# Patient Record
Sex: MALE | Race: WHITE | NOT HISPANIC OR LATINO | Employment: FULL TIME | URBAN - METROPOLITAN AREA
[De-identification: names, ages, dates, MRNs, and addresses within clinical notes are randomized per-mention and may not be internally consistent; named-entity substitution may affect disease eponyms.]

---

## 2023-03-11 ENCOUNTER — HOSPITAL ENCOUNTER (EMERGENCY)
Facility: CLINIC | Age: 28
Discharge: HOME | End: 2023-03-11
Attending: PHYSICIAN ASSISTANT | Admitting: PHYSICIAN ASSISTANT
Payer: OTHER MISCELLANEOUS

## 2023-03-11 ENCOUNTER — APPOINTMENT (OUTPATIENT)
Dept: GENERAL RADIOLOGY | Facility: CLINIC | Age: 28
End: 2023-03-11
Attending: PHYSICIAN ASSISTANT
Payer: OTHER MISCELLANEOUS

## 2023-03-11 VITALS
OXYGEN SATURATION: 98 % | DIASTOLIC BLOOD PRESSURE: 67 MMHG | SYSTOLIC BLOOD PRESSURE: 130 MMHG | RESPIRATION RATE: 18 BRPM | HEART RATE: 61 BPM | TEMPERATURE: 96.8 F

## 2023-03-11 DIAGNOSIS — M25.512 LEFT SHOULDER PAIN: ICD-10-CM

## 2023-03-11 PROCEDURE — 99213 OFFICE O/P EST LOW 20 MIN: CPT | Performed by: PHYSICIAN ASSISTANT

## 2023-03-11 PROCEDURE — G0463 HOSPITAL OUTPT CLINIC VISIT: HCPCS | Mod: 25 | Performed by: PHYSICIAN ASSISTANT

## 2023-03-11 PROCEDURE — 73030 X-RAY EXAM OF SHOULDER: CPT | Mod: LT

## 2023-03-11 ASSESSMENT — ACTIVITIES OF DAILY LIVING (ADL): ADLS_ACUITY_SCORE: 35

## 2023-03-11 ASSESSMENT — ENCOUNTER SYMPTOMS: CONSTITUTIONAL NEGATIVE: 1

## 2023-03-11 NOTE — ED PROVIDER NOTES
History     Chief Complaint   Patient presents with     Shoulder Pain     Slipped on ice at work. Left shoulder pain.      HPI  Nito Lowery is a 27 year old male who presents with complaints of left shoulder pain since he slipped and fell at work 3 days ago.  He states he landed with an outstretched left arm, injuring his left shoulder.  Patient has had persistent pain especially with range of motion of the left shoulder.  Patient is right-hand dominant.  Denies neck, elbow, or wrist pain.      Allergies:  Allergies   Allergen Reactions     No Known Allergies        Problem List:    Patient Active Problem List    Diagnosis Date Noted     Monocular esotropia 09/08/2003     Priority: Medium        Past Medical History:    Past Medical History:   Diagnosis Date     Unspecified otitis media        Past Surgical History:    History reviewed. No pertinent surgical history.    Family History:    Family History   Problem Relation Age of Onset     EYE* No family hx of        Social History:  Marital Status:  Single [1]  Social History     Tobacco Use     Smoking status: Never     Smokeless tobacco: Never   Substance Use Topics     Alcohol use: No     Drug use: No        Medications:    albuterol (PROVENTIL HFA: VENTOLIN HFA) 108 (90 BASE) MCG/ACT inhaler  Guaifenesin-Codeine (ROBITUSSIN A-C OR)          Review of Systems   Constitutional: Negative.    Musculoskeletal:        Left shoulder pain   Skin: Negative.    All other systems reviewed and are negative.      Physical Exam   BP: 130/67  Pulse: 61  Temp: 96.8  F (36  C)  Resp: 18  SpO2: 98 %      Physical Exam  Constitutional:       General: He is not in acute distress.     Appearance: Normal appearance. He is well-developed. He is not ill-appearing, toxic-appearing or diaphoretic.   HENT:      Head: Normocephalic and atraumatic.   Eyes:      Conjunctiva/sclera: Conjunctivae normal.   Cardiovascular:      Pulses: Normal pulses.   Pulmonary:      Effort: Pulmonary  effort is normal.   Musculoskeletal:         General: Normal range of motion.      Left shoulder: Tenderness and bony tenderness present. No swelling, deformity, effusion, laceration or crepitus. Normal range of motion. Normal strength. Normal pulse.      Left upper arm: Normal. No swelling, tenderness or bony tenderness.      Left elbow: Normal.      Cervical back: Full passive range of motion without pain and neck supple. No spinous process tenderness or muscular tenderness.      Comments: Left anterior shoulder tenderness to palpation.  Patient has full range of motion but has increased pain with forward flexion and internal rotation of the left shoulder.  Full strength.  Sensation intact.   Skin:     General: Skin is warm and dry.      Capillary Refill: Capillary refill takes less than 2 seconds.   Neurological:      General: No focal deficit present.      Mental Status: He is alert.      Sensory: Sensation is intact.      Motor: Motor function is intact.   Psychiatric:         Behavior: Behavior is cooperative.         ED Course                 Procedures      Results for orders placed or performed during the hospital encounter of 03/11/23 (from the past 24 hour(s))   XR Shoulder Left 2 Views    Narrative    EXAM: XR SHOULDER LEFT 2 VIEWS  LOCATION: [facility]  DATE/TIME: 3/11/2023 11:30 AM    INDICATION: Fall, anterior shoulder tenderness  COMPARISON: None.      Impression    IMPRESSION: Anatomic alignment left shoulder. No acute displaced left shoulder fracture. Normal acromioclavicular joint space. Flat acromion. Visualized left lung grossly clear.       Medications - No data to display    Assessments & Plan (with Medical Decision Making)     Pt is a 27 year old male who presents with complaints of left shoulder pain since he slipped and fell at work 3 days ago.  He states he landed with an outstretched left arm, injuring his left shoulder.  Patient has had persistent pain  especially with range of motion of the left shoulder.  Patient is right-hand dominant.  Denies neck, elbow, or wrist pain.    Pt is afebrile on arrival.  Exam as above.  X-rays of left shoulder were negative for fracture or malalignment.  Discussed results with patient.  Encouraged symptomatic treatments at home.  Return precautions were reviewed.  Hand-outs were provided.    Patient was instructed to follow-up with orthopedics for continued care and management.  He is to return to the ED for persistent and/or worsening symptoms.  Patient expressed understanding of the diagnosis and plan and was discharged home in good condition.    I have reviewed the nursing notes.    I have reviewed the findings, diagnosis, plan and need for follow up with the patient.    Discharge Medication List as of 3/11/2023 12:02 PM          Final diagnoses:   Left shoulder pain       3/11/2023   [facility] EMERGENCY DEPT      Disclaimer:  This note consists of symbols derived from keyboarding, dictation and/or voice recognition software.  As a result, there may be errors in the script that have gone undetected.  Please consider this when interpreting information found in this chart.     Ann Dawson PA-C  03/11/23 [id number]

## 2023-04-12 ENCOUNTER — OFFICE VISIT (OUTPATIENT)
Dept: ORTHOPEDICS | Facility: CLINIC | Age: 28
End: 2023-04-12
Attending: PHYSICIAN ASSISTANT
Payer: OTHER MISCELLANEOUS

## 2023-04-12 VITALS — HEART RATE: 64 BPM | SYSTOLIC BLOOD PRESSURE: 136 MMHG | WEIGHT: 177 LBS | DIASTOLIC BLOOD PRESSURE: 74 MMHG

## 2023-04-12 DIAGNOSIS — S49.92XA INJURY OF LEFT SHOULDER, INITIAL ENCOUNTER: ICD-10-CM

## 2023-04-12 PROBLEM — M25.512 LEFT SHOULDER PAIN: Status: ACTIVE | Noted: 2023-04-12

## 2023-04-12 PROCEDURE — 99204 OFFICE O/P NEW MOD 45 MIN: CPT | Performed by: PEDIATRICS

## 2023-04-12 NOTE — LETTER
4/12/2023         RE: Nito Lowery  [id number] [address] [address]        Dear Colleague,    Thank you for referring your patient, Nito Lowery, to the [facility] [place]. Please see a copy of my visit note below.    ASSESSMENT & PLAN    Nito was seen today for pain.    Diagnoses and all orders for this visit:    Injury of left shoulder, initial encounter  -     Orthopedic  Referral  -     XR SHOULDER CONTRAST CT/MR INJECTION ; Future  -     MR SHOULDER ARTHROGRAM  LEFT W CONTRAST; Future  -     Physical Therapy Referral; Future      This issue is acute and Unchanged.    We discussed these other possible diagnosis: Left shoulder injury, concern for labral tear, will obtain MRI.    Plan:  - Today's Plan of Care:  MRI of the Left Shoulder - Call [phone] to schedule MRI     Discussed activity considerations and other supportive care including Ice/Heat, OTC and other topical medications as needed.    Rehab: Physical Therapy: [facility] - [phone]    Work Letter    -We also discussed other future treatment options:  Referral to Orthopedic Surgery    Follow Up: In clinic with Dr. Quinteros after MRI (wait at least 1-2 days)    Concerning signs and symptoms were reviewed.  The patient expressed understanding of this management plan and all questions were answered at this time.    Carli Quinteros MD [facility]  Sports Medicine Physician  [facility]      -----  Chief Complaint   Patient presents with     Left Shoulder - Pain       SUBJECTIVE  Nito Lowery is a/an 27 year old male who is seen as an  referral for evaluation of left shoulder.   Note; may need to discuss workability    The patient is seen by themselves.  The patient is Right handed    Onset: March 8th, 2023. Patient describes injury as FOOSH, falling forward trying to catch himself on the ice while filling up his work vehicle with fuel   Location of Pain: left  shoulder; anterior, anterior deltoid   Worsened by: reaching overhead, reaching behind, reaching across  Better with: nothing   Treatments tried: ice, heat and previous imaging (xray 3/11/23)  Associated symptoms: locking or catching and pain with overhead movements     Orthopedic/Surgical history: YES - Date: right shoulder; labral tear  Social History/Occupation:      No family history pertinent to patient's problem today.    REVIEW OF SYSTEMS:  Review of Systems  Skin: no bruising, no swelling  Musculoskeletal: as above  Neurologic: no numbness, paresthesias  Remainder of review of systems is negative including constitutional, CV, pulmonary, GI, except as noted in HPI or medical history.    OBJECTIVE:  /74   Pulse 64   Wt 80.3 kg (177 lb)    General: healthy, alert and in no distress  HEENT: no scleral icterus or conjunctival erythema  Skin: no suspicious lesions or rash. No jaundice.  CV: distal perfusion intact  Resp: normal respiratory effort without conversational dyspnea   Psych: normal mood and affect  Gait: NORMAL  Neuro: Normal light sensory exam of upper extremity    Bilateral Shoulder exam  Inspection and Posture:       normal    Skin:        no visible deformities    Tender:        none    Non Tender:       remainder of shoulder bilateral    ROM:        Full active and passive ROM with flexion, extension, abduction, internal and external rotation bilateral       asymmetric scapular motion    Painful motions:       end range abduction and internal rotation left    Strength:        abduction 5/5 bilateral       flexion 5/5 bilateral       internal rotation 5/5 bilateral       external rotation 5/5 bilateral    Impingement testing:       neg (-) Neer left       neg (-) Ariza left       neg (-) empty can left       positive (+) O'julissa left    Sensation:        normal sensation over shoulder and upper extremity     RADIOLOGY:  I independently visualized and reviewed these images  with the patient    Reviewed left shoulder x-rays from 3/11/2023 -no acute abnormality, no degenerative changes    Results for orders placed or performed during the hospital encounter of 03/11/23   XR Shoulder Left 2 Views    Narrative    EXAM: XR SHOULDER LEFT 2 VIEWS  LOCATION: [facility]  DATE/TIME: 3/11/2023 11:30 AM    INDICATION: Fall, anterior shoulder tenderness  COMPARISON: None.      Impression    IMPRESSION: Anatomic alignment left shoulder. No acute displaced left shoulder fracture. Normal acromioclavicular joint space. Flat acromion. Visualized left lung grossly clear.       Reviewed UC note  Review of the result(s) of each unique test - XR             Again, thank you for allowing me to participate in the care of your patient.        Sincerely,        Carli Quinteros MD

## 2023-04-12 NOTE — PATIENT INSTRUCTIONS
We discussed these other possible diagnosis: Left shoulder injury, concern for labral tear, will obtain MRI.    Plan:  - Today's Plan of Care:  MRI of the Left Shoulder - Call [phone] to schedule MRI     Discussed activity considerations and other supportive care including Ice/Heat, OTC and other topical medications as needed.    [facility]hab: [facility] - [phone]    Work Letter    -We also discussed other future treatment options:  Referral to Orthopedic Surgery    Follow Up: In clinic with Dr. Quinteros after MRI (wait at least 1-2 days)    If you have any further questions for your physician or physician s care team you can call [phone] and use option 3 to leave a voice message.

## 2023-04-12 NOTE — LETTER
April 12, 2023      Nito Lowery  [address] [address]        To Whom It May Concern,       Nito was seen for a left shoulder injury.  He can return to work without restrictions, however, I am recommending MRI of the left shoulder. If needed, please allow ~ 2 days off after this procedure.      Sincerely,        Carli Quinteros MD

## 2023-04-12 NOTE — PROGRESS NOTES
ASSESSMENT & PLAN    Nito was seen today for pain.    Diagnoses and all orders for this visit:    Injury of left shoulder, initial encounter  -     Orthopedic  Referral  -     XR SHOULDER CONTRAST CT/MR INJECTION ; Future  -     MR SHOULDER ARTHROGRAM  LEFT W CONTRAST; Future  -     Physical Therapy Referral; Future      This issue is acute and Unchanged.    We discussed these other possible diagnosis: Left shoulder injury, concern for labral tear, will obtain MRI.    Plan:  - Today's Plan of Care:  MRI of the Left Shoulder - Call [phone] to schedule MRI     Discussed activity considerations and other supportive care including Ice/Heat, OTC and other topical medications as needed.    Rehab: Physical Therapy: [facility] - [phone]    Work Letter    -We also discussed other future treatment options:  Referral to Orthopedic Surgery    Follow Up: In clinic with Dr. Quinteros after MRI (wait at least 1-2 days)    Concerning signs and symptoms were reviewed.  The patient expressed understanding of this management plan and all questions were answered at this time.    Carli Quinteros MD [facility]  Sports Medicine Physician  [facility]      -----  Chief Complaint   Patient presents with     Left Shoulder - Pain       SUBJECTIVE  Nitorosalba Lowery is a/an 27 year old male who is seen as an UC referral for evaluation of left shoulder.   Note; may need to discuss workability    The patient is seen by themselves.  The patient is Right handed    Onset: March 8th, 2023. Patient describes injury as FOOSH, falling forward trying to catch himself on the ice while filling up his work vehicle with fuel   Location of Pain: left shoulder; anterior, anterior deltoid   Worsened by: reaching overhead, reaching behind, reaching across  Better with: nothing   Treatments tried: ice, heat and previous imaging (xray 3/11/23)  Associated symptoms: locking or catching and pain with overhead movements      Orthopedic/Surgical history: YES - Date: right shoulder; labral tear  Social History/Occupation:      No family history pertinent to patient's problem today.    REVIEW OF SYSTEMS:  Review of Systems  Skin: no bruising, no swelling  Musculoskeletal: as above  Neurologic: no numbness, paresthesias  Remainder of review of systems is negative including constitutional, CV, pulmonary, GI, except as noted in HPI or medical history.    OBJECTIVE:  /74   Pulse 64   Wt 80.3 kg (177 lb)    General: healthy, alert and in no distress  HEENT: no scleral icterus or conjunctival erythema  Skin: no suspicious lesions or rash. No jaundice.  CV: distal perfusion intact  Resp: normal respiratory effort without conversational dyspnea   Psych: normal mood and affect  Gait: NORMAL  Neuro: Normal light sensory exam of upper extremity    Bilateral Shoulder exam  Inspection and Posture:       normal    Skin:        no visible deformities    Tender:        none    Non Tender:       remainder of shoulder bilateral    ROM:        Full active and passive ROM with flexion, extension, abduction, internal and external rotation bilateral       asymmetric scapular motion    Painful motions:       end range abduction and internal rotation left    Strength:        abduction 5/5 bilateral       flexion 5/5 bilateral       internal rotation 5/5 bilateral       external rotation 5/5 bilateral    Impingement testing:       neg (-) Neer left       neg (-) Ariza left       neg (-) empty can left       positive (+) O'julissa left    Sensation:        normal sensation over shoulder and upper extremity     RADIOLOGY:  I independently visualized and reviewed these images with the patient    Reviewed left shoulder x-rays from 3/11/2023 -no acute abnormality, no degenerative changes    Results for orders placed or performed during the hospital encounter of 03/11/23   XR Shoulder Left 2 Views    Narrative    EXAM: XR SHOULDER LEFT 2  VIEWS  LOCATION: [facility]  DATE/TIME: 3/11/2023 11:30 AM    INDICATION: Fall, anterior shoulder tenderness  COMPARISON: None.      Impression    IMPRESSION: Anatomic alignment left shoulder. No acute displaced left shoulder fracture. Normal acromioclavicular joint space. Flat acromion. Visualized left lung grossly clear.       Reviewed UC note  Review of the result(s) of each unique test - XR

## 2023-04-27 ENCOUNTER — HOSPITAL ENCOUNTER (OUTPATIENT)
Dept: PHYSICAL THERAPY | Facility: CLINIC | Age: 28
Setting detail: THERAPIES SERIES
Discharge: HOME | End: 2023-04-27
Attending: PEDIATRICS
Payer: OTHER MISCELLANEOUS

## 2023-04-27 DIAGNOSIS — S49.92XA INJURY OF LEFT SHOULDER, INITIAL ENCOUNTER: ICD-10-CM

## 2023-04-27 PROCEDURE — 97161 PT EVAL LOW COMPLEX 20 MIN: CPT | Mod: GP

## 2023-04-27 PROCEDURE — 97110 THERAPEUTIC EXERCISES: CPT | Mod: GP

## 2023-04-27 NOTE — PROGRESS NOTES
04/27/23 1600   General Information   Type of Visit Initial OP Ortho PT Evaluation   Start of Care Date 04/27/23   Referring Physician Carli Quinteros MD   Patient/Family Goals Statement learn exs to strengthen shoulder, avoid surgery   Orders Evaluate and Treat   Date of Order 04/12/23   Certification Required? No   Medical Diagnosis Injury of left shoulder, initial encounter   Surgical/Medical history reviewed Yes  (asthma, R shld labrum repair May 2016)   Body Part(s)   Body Part(s) Shoulder   Presentation and Etiology   Pertinent history of current problem (include personal factors and/or comorbidities that impact the POC) Pt fell forward and injured his L shoulder 3/8/23.   Impairments A. Pain;D. Decreased ROM   Functional Limitations perform activities of daily living;perform required work activities;perform desired leisure / sports activities   Symptom Location L ant shld and proximal bicep   How/Where did it occur With a fall;At work   Onset date of current episode/exacerbation 03/08/23   Chronicity New   Pain rating (0-10 point scale) Best (/10);Worst (/10)   Best (/10) 0   Worst (/10) 5   Pain quality A. Sharp   Frequency of pain/symptoms C. With activity   Pain/symptoms are: Worse in the morning   Pain/symptoms exacerbated by C. Lifting;G. Certain positions  (lifting weights, crossing body, behind back, reaching for radio on belt)   Pain/symptoms eased by C. Rest   Progression of symptoms since onset: Improved   Prior Level of Function   Functional Level Prior Comment independent   Current Level of Function   Current Community Support Family/friend caregiver   Patient role/employment history A. 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 No   Have you fallen and had an injury in the past year? Yes   Is patient a fall risk? No    Fall screen comments slipped on ice   Abuse Screen (yes response referral indicated)   Feels Unsafe at Home or Work/School no   Feels Threatened by Someone no   Does Anyone Try to Keep You From Having Contact with Others or Doing Things Outside Your Home? no   Physical Signs of Abuse Present no   Functional Scales   Functional Scales Other   Other Scales  SPADI 7.69   Shoulder Objective Findings   Side (if bilateral, select both right and left) Left   Shoulder ROM Comment pain at endranges except IR   Shoulder Flexibility Comments B UE strength 5/5   Neer's Test +   Ariza-Sonido Test +   [place]'s Test -   Load and Shift Test -   Crossover Test -   Palpation no palpable tenderness in L supraspinatus, infraspinatus, teres min/stephon, deltoid, biceps   Posture rounded shlds   Left Shoulder Flexion PROM 165*   Left Shoulder Abduction PROM 114*   Left Shoulder ER PROM 50*   Left Shoulder IR PROM 90*   Planned Therapy Interventions   Planned Therapy Interventions joint mobilization;manual therapy;ROM;stretching;strengthening   Clinical Impression   Criteria for Skilled Therapeutic Interventions Met yes, treatment indicated   PT Diagnosis L shoulder pain. Signs and sxs suggest mild adhesive capsulitis.   Influenced by the following impairments decr ROM, incr pain   Functional limitations due to impairments weight lifting, reaching overhead, behind back   Clinical Presentation Stable/Uncomplicated   Clinical Presentation Rationale clinical judgement   Clinical Decision Making (Complexity) Low complexity   Therapy Frequency 1 time/week   Predicted Duration of Therapy Intervention (days/wks) 6 weeks   Risk & Benefits of therapy have been explained Yes   Patient, Family & other staff in agreement with plan of care Yes   Education Assessment   Preferred Learning Style Listening;Demonstration;Pictures/video   Barriers to Learning No barriers   ORTHO GOALS   PT Ortho Eval Goals 1;2;3   Ortho Goal 1   Goal Identifier 1   Goal  Description Pt will be able to reach behind his back without pain.   Target Date 05/18/23   Ortho Goal 2   Goal Identifier 2   Goal Description Pt will be able to lift 10 # overhead with < 2/10 pain.   Target Date 06/08/23   Ortho Goal 3   Goal Identifier 3   Goal Description Pt will be independent with HEP for optimal functional recovery.   Target Date 06/08/23   Total Evaluation Time   PT Eval, Low Complexity Minutes (63627) 20     Elza Casas PT

## 2023-05-03 ENCOUNTER — HOSPITAL ENCOUNTER (OUTPATIENT)
Dept: GENERAL RADIOLOGY | Facility: CLINIC | Age: 28
Discharge: HOME | End: 2023-05-03
Attending: PEDIATRICS
Payer: OTHER MISCELLANEOUS

## 2023-05-03 ENCOUNTER — HOSPITAL ENCOUNTER (OUTPATIENT)
Dept: MRI IMAGING | Facility: CLINIC | Age: 28
Discharge: HOME | End: 2023-05-03
Attending: PEDIATRICS
Payer: OTHER MISCELLANEOUS

## 2023-05-03 DIAGNOSIS — S49.92XA INJURY OF LEFT SHOULDER, INITIAL ENCOUNTER: ICD-10-CM

## 2023-05-03 PROCEDURE — A9585 GADOBUTROL INJECTION: HCPCS | Performed by: PEDIATRICS

## 2023-05-03 PROCEDURE — 77002 NEEDLE LOCALIZATION BY XRAY: CPT

## 2023-05-03 PROCEDURE — 255N000002 HC RX 255 OP 636: Performed by: PEDIATRICS

## 2023-05-03 PROCEDURE — 250N000011 HC RX IP 250 OP 636: Performed by: PEDIATRICS

## 2023-05-03 PROCEDURE — 73222 MRI JOINT UPR EXTREM W/DYE: CPT | Mod: LT

## 2023-05-03 PROCEDURE — 250N000009 HC RX 250: Performed by: PEDIATRICS

## 2023-05-03 RX ORDER — BUPIVACAINE HYDROCHLORIDE 2.5 MG/ML
10 INJECTION, SOLUTION INFILTRATION; PERINEURAL ONCE
Status: COMPLETED | OUTPATIENT
Start: 2023-05-03 | End: 2023-05-03

## 2023-05-03 RX ORDER — GADOBUTROL 604.72 MG/ML
0.1 INJECTION INTRAVENOUS ONCE
Status: COMPLETED | OUTPATIENT
Start: 2023-05-03 | End: 2023-05-03

## 2023-05-03 RX ORDER — EPINEPHRINE 1 MG/ML
0.1 INJECTION, SOLUTION, CONCENTRATE INTRAVENOUS ONCE
Status: COMPLETED | OUTPATIENT
Start: 2023-05-03 | End: 2023-05-03

## 2023-05-03 RX ADMIN — IOHEXOL 10 ML: 180 INJECTION INTRAVENOUS at 10:40

## 2023-05-03 RX ADMIN — GADOBUTROL 2 ML: 604.72 INJECTION INTRAVENOUS at 10:42

## 2023-05-03 RX ADMIN — EPINEPHRINE 1 MG: 1 INJECTION, SOLUTION, CONCENTRATE INTRAVENOUS at 10:42

## 2023-05-03 RX ADMIN — LIDOCAINE HYDROCHLORIDE 10 ML: 10 INJECTION, SOLUTION EPIDURAL; INFILTRATION; INTRACAUDAL; PERINEURAL at 10:36

## 2023-05-03 RX ADMIN — BUPIVACAINE HYDROCHLORIDE 25 MG: 2.5 INJECTION, SOLUTION EPIDURAL; INFILTRATION; INTRACAUDAL; PERINEURAL at 10:39

## 2023-05-24 ENCOUNTER — OFFICE VISIT (OUTPATIENT)
Dept: ORTHOPEDICS | Facility: CLINIC | Age: 28
End: 2023-05-24
Payer: OTHER MISCELLANEOUS

## 2023-05-24 VITALS — WEIGHT: 177 LBS | DIASTOLIC BLOOD PRESSURE: 86 MMHG | SYSTOLIC BLOOD PRESSURE: 134 MMHG | HEART RATE: 61 BPM

## 2023-05-24 DIAGNOSIS — S49.92XD INJURY OF LEFT SHOULDER, SUBSEQUENT ENCOUNTER: Primary | ICD-10-CM

## 2023-05-24 DIAGNOSIS — S43.432D SUPERIOR GLENOID LABRUM LESION OF LEFT SHOULDER, SUBSEQUENT ENCOUNTER: ICD-10-CM

## 2023-05-24 PROCEDURE — 99213 OFFICE O/P EST LOW 20 MIN: CPT | Performed by: PEDIATRICS

## 2023-05-24 NOTE — PROGRESS NOTES
ASSESSMENT & PLAN    Nito was seen today for follow up and mri transcription.    Diagnoses and all orders for this visit:    Injury of left shoulder, subsequent encounter  -     Orthopedic  Referral; Future    Superior glenoid labrum lesion of left shoulder, subsequent encounter  -     Orthopedic  Referral; Future      This issue is acute and Improving.    We discussed these other possible diagnosis: Left shoulder SLAP tear, overall clinically doing ok. Would continue Home Exercise Program, given injury reasonable to consult with orthopedic surgery.    Plan:  - Today's Plan of Care:  Referral to an Orthopedic Surgeon  Discussed activity considerations and other supportive care including Ice/Heat, OTC and other topical medications as needed.    Continue PT and Home Exercise Program    -We also discussed other future treatment options:  US guided injections    Follow Up: as needed with me    Concerning signs and symptoms were reviewed and all questions were answered at this time.    Carli Quinteros MD [facility]  Sports Medicine Physician  [facility]      SUBJECTIVE- Interim History May 24, 2023    Chief Complaint   Patient presents with     Left Shoulder - Follow Up, MRI Transcription       Nito Lowery is a 27 year old male who is seen in f/u up for    Injury of left shoulder, subsequent encounter  Superior glenoid labrum lesion of left shoulder, subsequent encounter.  Since last visit on 4/12/23, patient has been doing well. Less pain with PT Previously had pain with reaching overhead, reaching behind and reaching across and now those have all subsided.  Patient does still experience some locking and catching.   Patient has obtained an MRI since his last visit.     Previous Hx 4/12/23:  Patient describes injury as FOOSH, falling forward trying to catch himself on the ice while filling up his work vehicle with fuel   Location of Pain: left shoulder; anterior, anterior deltoid    Worsened by: reaching overhead, reaching behind, reaching across  Better with: nothing   Treatments tried: ice, heat and previous imaging (xray 3/11/23)  Associated symptoms: locking or catching and pain with overhead movements      Orthopedic/Surgical history: YES - Date: right shoulder; labral tear  Social History/Occupation:          REVIEW OF SYSTEMS:  Review of Systems    OBJECTIVE:  /86   Pulse 61   Wt 80.3 kg (177 lb)    General: healthy, alert and in no distress  Skin: no suspicious lesions or rash.  CV: distal perfusion intact  Resp: normal respiratory effort without conversational dyspnea   Psych: normal mood and affect  Gait: NORMAL  Neuro: Normal light sensory exam of upper extremity    Bilateral Shoulder exam  Inspection and Posture:       normal     Skin:        no visible deformities     Tender:        none     Non Tender:       remainder of shoulder bilateral     ROM:        Full active and passive ROM with flexion, extension, abduction, internal and external rotation bilateral       asymmetric scapular motion     Painful motions:       end range abduction and internal rotation left     Strength:        abduction 5/5 bilateral       flexion 5/5 bilateral       internal rotation 5/5 bilateral       external rotation 5/5 bilateral     Impingement testing:       neg (-) Neer left       neg (-) Ariza left       neg (-) empty can left       positive (+) O'julissa left     Sensation:        normal sensation over shoulder and upper extremity     RADIOLOGY:  Final results and radiologist's interpretation, available in the [facility] health record.  Images were reviewed with the patient in the office today.  My personal interpretation of the performed imaging:   Reviewed MR arthrogram left shoulder 5/3/2023 - SLAP tear    Results for orders placed or performed during the hospital encounter of 05/03/23   MR SHOULDER ARTHROGRAM  LEFT W CONTRAST    Narrative    MR ARTHROGRAM OF THE LEFT  SHOULDER  5/3/2023 at 1111 hours    INDICATION: Persistent left shoulder pain. Possible labral tear.    COMPARISON: None.    TECHNIQUE: MR arthrogram protocol, obtained after administration of  dilute gadolinium solution into the glenohumeral joint.    FINDINGS:    ROTATOR CUFF:  Supraspinatus tendon intact. No tendinopathy.  Infraspinatus tendon intact. No tendinopathy.  Subscapularis tendon intact. No tendinopathy.  Teres minor tendon intact. No tendinopathy.  No rotator cuff muscle atrophy or fatty infiltration.    CORACOACROMIAL ARCH: Type II acromion. No subacromial spurring. No  superior subluxation of the humeral head. No fluid in the subacromial  subdeltoid bursa.    ACROMIOCLAVICULAR JOINT: No arthrosis.     BICEPS TENDON: Proximal long head biceps tendon intact. No  tendinopathy or tenosynovitis. No subluxation.    GLENOHUMERAL JOINT AND LABRUM: There is a nondisplaced type II SLAP  tear of the superior portion of the labrum as best seen on series 3  image 12. This extends caudally to involve the anterior superior  labral quadrant. No paralabral cyst. Focal area of fissuring of the  articular cartilage of the anterior superior portion of the glenoid.  Glenohumeral ligaments are intact.    BONES: No fracture or bone contusion.    SOFT TISSUES: Normal.      Impression    IMPRESSION:  1.  Type II SLAP tear of the labrum with caudal extension to involve  the anterior superior labral quadrant.  2.    3.  No evidence of rotator cuff tear.  4.    5.  Tiny area of deep fissuring of the articular cartilage of the  anterior superior portion of the labrum.      DEVON SAENZ MD         SYSTEM ID:  [id number]         Review of prior external note(s) from - PT note  Review of the result(s) of each unique test - MRI

## 2023-05-24 NOTE — LETTER
5/24/2023         RE: Nito Lowery  [address] [address] [address]        Dear Colleague,    Thank you for referring your patient, Nito Lowery, to the [facility] [place]. Please see a copy of my visit note below.    ASSESSMENT & PLAN    Nito was seen today for follow up and mri transcription.    Diagnoses and all orders for this visit:    Injury of left shoulder, subsequent encounter  -     Orthopedic  Referral; Future    Superior glenoid labrum lesion of left shoulder, subsequent encounter  -     Orthopedic  Referral; Future      This issue is acute and Improving.    We discussed these other possible diagnosis: Left shoulder SLAP tear, overall clinically doing ok. Would continue Home Exercise Program, given injury reasonable to consult with orthopedic surgery.    Plan:  - Today's Plan of Care:  Referral to an Orthopedic Surgeon  Discussed activity considerations and other supportive care including Ice/Heat, OTC and other topical medications as needed.    Continue PT and Home Exercise Program    -We also discussed other future treatment options:  US guided injections    Follow Up: as needed with me    Concerning signs and symptoms were reviewed and all questions were answered at this time.    Carli Quinteros MD [facility]  Sports Medicine Physician  [facility]      SUBJECTIVE- Interim History May 24, 2023    Chief Complaint   Patient presents with     Left Shoulder - Follow Up, MRI Transcription       Nito Lowery is a 27 year old male who is seen in f/u up for    Injury of left shoulder, subsequent encounter  Superior glenoid labrum lesion of left shoulder, subsequent encounter.  Since last visit on 4/12/23, patient has been doing well. Less pain with PT Previously had pain with reaching overhead, reaching behind and reaching across and now those have all subsided.  Patient does still experience some locking and catching.    Patient has obtained an MRI since his last visit.     Previous Hx 4/12/23:  Patient describes injury as FOOSH, falling forward trying to catch himself on the ice while filling up his work vehicle with fuel   Location of Pain: left shoulder; anterior, anterior deltoid   Worsened by: reaching overhead, reaching behind, reaching across  Better with: nothing   Treatments tried: ice, heat and previous imaging (xray 3/11/23)  Associated symptoms: locking or catching and pain with overhead movements      Orthopedic/Surgical history: YES - Date: right shoulder; labral tear  Social History/Occupation:          REVIEW OF SYSTEMS:  Review of Systems    OBJECTIVE:  /86   Pulse 61   Wt 80.3 kg (177 lb)    General: healthy, alert and in no distress  Skin: no suspicious lesions or rash.  CV: distal perfusion intact  Resp: normal respiratory effort without conversational dyspnea   Psych: normal mood and affect  Gait: NORMAL  Neuro: Normal light sensory exam of upper extremity    Bilateral Shoulder exam  Inspection and Posture:       normal     Skin:        no visible deformities     Tender:        none     Non Tender:       remainder of shoulder bilateral     ROM:        Full active and passive ROM with flexion, extension, abduction, internal and external rotation bilateral       asymmetric scapular motion     Painful motions:       end range abduction and internal rotation left     Strength:        abduction 5/5 bilateral       flexion 5/5 bilateral       internal rotation 5/5 bilateral       external rotation 5/5 bilateral     Impingement testing:       neg (-) Neer left       neg (-) Ariza left       neg (-) empty can left       positive (+) O'julissa left     Sensation:        normal sensation over shoulder and upper extremity     RADIOLOGY:  Final results and radiologist's interpretation, available in the [facility] health record.  Images were reviewed with the patient in the office today.  My personal  interpretation of the performed imaging:   Reviewed MR arthrogram left shoulder 5/3/2023 - SLAP tear    Results for orders placed or performed during the hospital encounter of 05/03/23   MR SHOULDER ARTHROGRAM  LEFT W CONTRAST    Narrative    MR ARTHROGRAM OF THE LEFT SHOULDER  5/3/2023 at 1111 hours    INDICATION: Persistent left shoulder pain. Possible labral tear.    COMPARISON: None.    TECHNIQUE: MR arthrogram protocol, obtained after administration of  dilute gadolinium solution into the glenohumeral joint.    FINDINGS:    ROTATOR CUFF:  Supraspinatus tendon intact. No tendinopathy.  Infraspinatus tendon intact. No tendinopathy.  Subscapularis tendon intact. No tendinopathy.  Teres minor tendon intact. No tendinopathy.  No rotator cuff muscle atrophy or fatty infiltration.    CORACOACROMIAL ARCH: Type II acromion. No subacromial spurring. No  superior subluxation of the humeral head. No fluid in the subacromial  subdeltoid bursa.    ACROMIOCLAVICULAR JOINT: No arthrosis.     BICEPS TENDON: Proximal long head biceps tendon intact. No  tendinopathy or tenosynovitis. No subluxation.    GLENOHUMERAL JOINT AND LABRUM: There is a nondisplaced type II SLAP  tear of the superior portion of the labrum as best seen on series 3  image 12. This extends caudally to involve the anterior superior  labral quadrant. No paralabral cyst. Focal area of fissuring of the  articular cartilage of the anterior superior portion of the glenoid.  Glenohumeral ligaments are intact.    BONES: No fracture or bone contusion.    SOFT TISSUES: Normal.      Impression    IMPRESSION:  1.  Type II SLAP tear of the labrum with caudal extension to involve  the anterior superior labral quadrant.  2.    3.  No evidence of rotator cuff tear.  4.    5.  Tiny area of deep fissuring of the articular cartilage of the  anterior superior portion of the labrum.      DEVON SAENZ MD         SYSTEM ID:  [id number]         Review of prior external note(s)  from - PT note  Review of the result(s) of each unique test - MRI             Again, thank you for allowing me to participate in the care of your patient.        Sincerely,        Carli Quinteros MD

## 2023-05-24 NOTE — PATIENT INSTRUCTIONS
We discussed these other possible diagnosis: Left shoulder SLAP tear, overall clinically doing ok. Would continue Home Exercise Program, given injury reasonable to consult with orthopedic surgery.    Plan:  - Today's Plan of Care:  Referral to an Orthopedic Surgeon  Discussed activity considerations and other supportive care including Ice/Heat, OTC and other topical medications as needed.    Continue PT and Home Exercise Program    -We also discussed other future treatment options:  US guided injections    Follow Up: as needed with me    If you have any further questions for your physician or physician s care team you can call [phone] and use option 3 to leave a voice message.

## 2023-06-02 ENCOUNTER — HEALTH MAINTENANCE LETTER (OUTPATIENT)
Age: 28
End: 2023-06-02

## 2023-06-16 NOTE — TELEPHONE ENCOUNTER
DIAGNOSIS:   Injury of left shoulder, subsequent encounter [S49.92XD]  - Primary       Superior glenoid labrum lesion of left shoulder, subsequent encounter [S43.432D]          APPOINTMENT DATE: 06/21/2023   NOTES STATUS DETAILS   OFFICE NOTE from referring provider Internal Carli Quinteros MD - [facility]   OFFICE NOTE from other specialist Internal    DISCHARGE REPORT from the ER Internal 03/11/2023 - [facility] ED   MEDICATION LIST Internal    MRI Internal    XRAYS (IMAGES & REPORTS) Internal

## 2023-06-21 ENCOUNTER — PRE VISIT (OUTPATIENT)
Dept: ORTHOPEDICS | Facility: CLINIC | Age: 28
End: 2023-06-21

## 2023-06-21 ENCOUNTER — OFFICE VISIT (OUTPATIENT)
Dept: ORTHOPEDICS | Facility: CLINIC | Age: 28
End: 2023-06-21
Attending: PEDIATRICS
Payer: OTHER MISCELLANEOUS

## 2023-06-21 DIAGNOSIS — S49.92XD INJURY OF LEFT SHOULDER, SUBSEQUENT ENCOUNTER: ICD-10-CM

## 2023-06-21 DIAGNOSIS — S43.432D SUPERIOR GLENOID LABRUM LESION OF LEFT SHOULDER, SUBSEQUENT ENCOUNTER: ICD-10-CM

## 2023-06-21 PROCEDURE — 99204 OFFICE O/P NEW MOD 45 MIN: CPT | Performed by: ORTHOPAEDIC SURGERY

## 2023-06-21 NOTE — PROGRESS NOTES
CHIEF COMPLAINT: left shoulder pain    DIAGNOSIS: Left shoulder superior labral tear    OCCUPATION/SPORT:     HPI:   Nito Lowery is a very pleasant 27 year old, right-hand dominant male who presents for evaluation of left shoulder pain.  Symptoms started on 3/8/23. There was a precipitating event where he was falling forward trying to catch himself on the ice while filling up his work vehicle with fuel. The pain is located to the anterior shoulder in the biceps area. Worst pain is rated a 4 of 10, and current pain is rated at 0 of 10. Symptoms are worsened if he over does it. Symptoms are improved with ice. Patient has tried adaptation with good relief. Associated symptoms include slight pain. Patient has no symptpms radiating down the arm, with no numbness. Notably, the patient has had a history of right shoulder labral repair when he was in high school. No other concerns or complaints at this time.  SANE Score R - 100 L - 98    PAST MEDICAL HISTORY:  Past Medical History:   Diagnosis Date     Unspecified otitis media        PAST SURGICAL HISTORY:  No past surgical history on file.    CURRENT MEDICATIONS:  Current Outpatient Medications   Medication Sig Dispense Refill     albuterol (PROVENTIL HFA: VENTOLIN HFA) 108 (90 BASE) MCG/ACT inhaler Inhale 2 puffs into the lungs every 4 hours as needed for shortness of breath / dyspnea. (Patient not taking: Reported on 4/12/2023) 1 Inhaler 2     Guaifenesin-Codeine (ROBITUSSIN A-C OR) Take  by mouth as needed. 1-2 tsp every 4-6 hours as needed for cough (Patient not taking: Reported on 3/11/2023)         ALLERGIES:      Allergies   Allergen Reactions     No Known Allergies          FAMILY HISTORY: No pertinent family history, reviewed in EMR.    SOCIAL HISTORY:   Social History     Socioeconomic History     Marital status: Single     Spouse name: Not on file     Number of children: Not on file     Years of education: Not on file     Highest education  level: Not on file   Occupational History     Not on file   Tobacco Use     Smoking status: Never     Smokeless tobacco: Never   Substance and Sexual Activity     Alcohol use: No     Drug use: No     Sexual activity: Never   Other Topics Concern     Not on file   Social History Narrative     Not on file     Social Determinants of Health     Financial Resource Strain: Not on file   Food Insecurity: Not on file   Transportation Needs: Not on file   Physical Activity: Not on file   Stress: Not on file   Social Connections: Not on file   Intimate Partner Violence: Not on file   Housing Stability: Not on file       REVIEW OF SYSTEMS: Positive for that noted in past medical history and history of present illness and otherwise reviewed in EMR    PHYSICAL EXAM:  Patient is Data Unavailable and weighs 0 lbs 0 oz There were no vitals taken for this visit.  There is no height or weight on file to calculate BMI.   Constitutional: Well-developed, well-nourished, healthy appearing male.  Skin: Warm, dry   HEENT: Normal  Cardiac: Well perfused extremities, strong 2+ peripheral pulses. No edema.   Pulmonary: Breathing room air    Musculoskeletal:   Left Shoulder:  AROM left shoulder: 170/170/70/T10   AROM righ5 shoulder: 170/170/70/T10   5/5 supraspinatus, 5/5 infraspinatus, 5/5 subscapularis  no AC joint pain, negative cross body adduction  negative Neer and Ariza impingement signs  negative belly-press/lift-off  negative Speed's test  negative Apprehension  negative Jerk test  Neurovascular exam and cervical spine exam are normal.      ADVANCED IMAGING: I personally reviewed the prior x-rays and MRI which demonstrate a tear of the superior labrum with extension to the anterior labrum.  There is no rotator cuff tear.    IMPRESSION: 27 year old-year-old right hand dominant male, with left shoulder superior labral tear.     PLAN:     I discussed with the patient the etiology of their condition. We discussed at length the options  as noted above.  Went through the results of the MRI today.  I discussed with the patient the significance of a superior labral tear including the attachment site of the biceps.  Overall the patient is minimally symptomatic, is having some mild pain when using a squat rack but modified Florack and is now having no pain.  We discussed continuing activities as tolerated, discussed high load shoulder activities that may increase pain things to avoid including push-ups pull-ups chest press.  Patient is going to continue activities as tolerated and follow-up as needed.    At the conclusion of the office visit, iNto verbally acknowledged that I answered all of his questions satisfactorily.    Wendy Esqueda MD  Orthopedic Surgery Sports Medicine and Shoulder Surgery

## 2023-06-21 NOTE — LETTER
6/21/2023         RE: Nito Lowery  [address] [facility] [address]        Dear Colleague,    Thank you for referring your patient, Nito Lowery, to the [facility] [place]. Please see a copy of my visit note below.    CHIEF COMPLAINT: left shoulder pain    DIAGNOSIS: Left shoulder superior labral tear    OCCUPATION/SPORT:     HPI:   Nito Lowery is a very pleasant 27 year old, right-hand dominant male who presents for evaluation of left shoulder pain.  Symptoms started on 3/8/23. There was a precipitating event where he was falling forward trying to catch himself on the ice while filling up his work vehicle with fuel. The pain is located to the anterior shoulder in the biceps area. Worst pain is rated a 4 of 10, and current pain is rated at 0 of 10. Symptoms are worsened if he over does it. Symptoms are improved with ice. Patient has tried adaptation with good relief. Associated symptoms include slight pain. Patient has no symptpms radiating down the arm, with no numbness. Notably, the patient has had a history of right shoulder labral repair when he was in high school. No other concerns or complaints at this time.  SANE Score R - 100 L - 98    PAST MEDICAL HISTORY:  Past Medical History:   Diagnosis Date    Unspecified otitis media        PAST SURGICAL HISTORY:  No past surgical history on file.    CURRENT MEDICATIONS:  Current Outpatient Medications   Medication Sig Dispense Refill    albuterol (PROVENTIL HFA: VENTOLIN HFA) 108 (90 BASE) MCG/ACT inhaler Inhale 2 puffs into the lungs every 4 hours as needed for shortness of breath / dyspnea. (Patient not taking: Reported on 4/12/2023) 1 Inhaler 2    Guaifenesin-Codeine (ROBITUSSIN A-C OR) Take  by mouth as needed. 1-2 tsp every 4-6 hours as needed for cough (Patient not taking: Reported on 3/11/2023)         ALLERGIES:      Allergies   Allergen Reactions    No Known Allergies          FAMILY  HISTORY: No pertinent family history, reviewed in EMR.    SOCIAL HISTORY:   Social History     Socioeconomic History    Marital status: Single     Spouse name: Not on file    Number of children: Not on file    Years of education: Not on file    Highest education level: Not on file   Occupational History    Not on file   Tobacco Use    Smoking status: Never    Smokeless tobacco: Never   Substance and Sexual Activity    Alcohol use: No    Drug use: No    Sexual activity: Never   Other Topics Concern    Not on file   Social History Narrative    Not on file     Social Determinants of Health     Financial Resource Strain: Not on file   Food Insecurity: Not on file   Transportation Needs: Not on file   Physical Activity: Not on file   Stress: Not on file   Social Connections: Not on file   Intimate Partner Violence: Not on file   Housing Stability: Not on file       REVIEW OF SYSTEMS: Positive for that noted in past medical history and history of present illness and otherwise reviewed in EMR    PHYSICAL EXAM:  Patient is Data Unavailable and weighs 0 lbs 0 oz There were no vitals taken for this visit.  There is no height or weight on file to calculate BMI.   Constitutional: Well-developed, well-nourished, healthy appearing male.  Skin: Warm, dry   HEENT: Normal  Cardiac: Well perfused extremities, strong 2+ peripheral pulses. No edema.   Pulmonary: Breathing room air    Musculoskeletal:   Left Shoulder:  AROM left shoulder: 170/170/70/T10   AROM righ5 shoulder: 170/170/70/T10   5/5 supraspinatus, 5/5 infraspinatus, 5/5 subscapularis  no AC joint pain, negative cross body adduction  negative Neer and Ariza impingement signs  negative belly-press/lift-off  negative Speed's test  negative Apprehension  negative Jerk test  Neurovascular exam and cervical spine exam are normal.      ADVANCED IMAGING: I personally reviewed the prior x-rays and MRI which demonstrate a tear of the superior labrum with extension to the anterior  labrum.  There is no rotator cuff tear.    IMPRESSION: 27 year old-year-old right hand dominant male, with left shoulder superior labral tear.     PLAN:     I discussed with the patient the etiology of their condition. We discussed at length the options as noted above.  Went through the results of the MRI today.  I discussed with the patient the significance of a superior labral tear including the attachment site of the biceps.  Overall the patient is minimally symptomatic, is having some mild pain when using a squat rack but modified Florack and is now having no pain.  We discussed continuing activities as tolerated, discussed high load shoulder activities that may increase pain things to avoid including push-ups pull-ups chest press.  Patient is going to continue activities as tolerated and follow-up as needed.    At the conclusion of the office visit, Nito verbally acknowledged that I answered all of his questions satisfactorily.    Wendy Esqueda MD  Orthopedic Surgery Sports Medicine and Shoulder Surgery

## 2023-11-12 ENCOUNTER — HOSPITAL ENCOUNTER (EMERGENCY)
Facility: CLINIC | Age: 28
Discharge: HOME | End: 2023-11-12
Attending: EMERGENCY MEDICINE | Admitting: EMERGENCY MEDICINE
Payer: OTHER MISCELLANEOUS

## 2023-11-12 VITALS
HEART RATE: 84 BPM | BODY MASS INDEX: 30.94 KG/M2 | HEIGHT: 71 IN | OXYGEN SATURATION: 96 % | SYSTOLIC BLOOD PRESSURE: 127 MMHG | RESPIRATION RATE: 18 BRPM | WEIGHT: 221 LBS | DIASTOLIC BLOOD PRESSURE: 80 MMHG

## 2023-11-12 DIAGNOSIS — S06.0X0A CONCUSSION WITHOUT LOSS OF CONSCIOUSNESS, INITIAL ENCOUNTER: ICD-10-CM

## 2023-11-12 DIAGNOSIS — S09.90XA INJURY OF HEAD, INITIAL ENCOUNTER: ICD-10-CM

## 2023-11-12 DIAGNOSIS — S00.01XA ABRASION, SCALP W/O INFECTION: ICD-10-CM

## 2023-11-12 PROCEDURE — 99283 EMERGENCY DEPT VISIT LOW MDM: CPT | Performed by: EMERGENCY MEDICINE

## 2023-11-12 PROCEDURE — 99282 EMERGENCY DEPT VISIT SF MDM: CPT

## 2023-11-12 ASSESSMENT — ENCOUNTER SYMPTOMS
NAUSEA: 0
SPEECH DIFFICULTY: 0
SHORTNESS OF BREATH: 0
WOUND: 1
WEAKNESS: 0
BACK PAIN: 0
PHOTOPHOBIA: 0
FATIGUE: 0
CHEST TIGHTNESS: 0
CHILLS: 0
HEADACHES: 1
NECK PAIN: 0
APPETITE CHANGE: 0
FEVER: 0
ABDOMINAL PAIN: 0
CONFUSION: 0

## 2023-11-12 ASSESSMENT — ACTIVITIES OF DAILY LIVING (ADL): ADLS_ACUITY_SCORE: 35

## 2023-11-12 NOTE — DISCHARGE INSTRUCTIONS
What is a concussion?  A concussion is a mild traumatic brain injury (TBI) that occurs from a direct blow or bump to the head. It can also result from a blow to another part of the body when the force travels to the head. A concussion can affect coordination, learning, memory, and emotions.     Most concussions heal without issue. However, like any other injury, a brain injury should be given time to heal, which includes physical and mental rest.     One of the most severe problems that can occur is bleeding inside the brain. If bleeding or swelling occurs, pressure in the skull rises and can cause brain injury. Bleeding might develop right after an injury or hours later, so monitoring symptoms is critical as this can be life threatening.    Signs and Symptoms  Common symptoms include:   Altered mental status including confusion, inappropriate emotions, agitation or abrupt change in personality   Amnesia/memory problems   Blurred vision/double vision/seeing stars or black spots   Dizziness, poor balance or unsteadiness   Excessive fatigue/feel slowed down   Excessive or persistent headache   Excessive sensitivity to light or loud noise    Feel  in a fog    Loss of consciousness or orientation   Ringing in ears   Vacant stare/glassy eyed   Vomiting    Why do a baseline computer test?  Neurocognitive tests, such as ImPACT , provide information on how the brain is responding to injury. ImPACT has two components: a pre- and post-concussion test. The pre-test scoring represents one s baseline (normal) brain function. The ImPACT test is then repeated post injury. Results of the pre- and post-concussion tests are compared and a care plan is developed. An ImPACT test should be completed yearly due to natural maturing of the brain.    What can I expect from [facility] concussion program?  [facility] providers will:   Facilitate ImPACT tests    Manage concussion symptoms and make  recommendations for return to activity   Facilitate post-concussion testing   Our team includes other healthcare providers, including neuropsychologists, neurologists and therapists who specialize in concussion management and will provide you with comprehensive, coordinated care    When is it safe to return to activity?  You should be free of symptoms and have returned to normal sleeping and eating patterns as well as typical concentration levels before resuming activity. Once normal activities have resumed and there are no symptoms at rest, more demanding activities may be tried. Over time, activity will be increased as long as symptoms do not return. A gradual return to activities allows us the opportunity to assess brain healing.     Under no circumstances should anyone return to activity while experiencing concussion signs or symptoms. There should be no return to activity on the same day concussion symptoms are noted or a formal diagnosis of a concussion is made.  For more information contact:  Sports concussion hotline: [phone]  Schedule an appointment: [phone]  [email or web address]    For the same link to the [email or web address] website above, you can use this QR code:
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
## 2023-11-16 ENCOUNTER — DOCUMENTATION ONLY (OUTPATIENT)
Dept: ORTHOPEDICS | Facility: CLINIC | Age: 28
End: 2023-11-16

## 2024-06-22 ENCOUNTER — HEALTH MAINTENANCE LETTER (OUTPATIENT)
Age: 29
End: 2024-06-22

## 2025-07-12 ENCOUNTER — HEALTH MAINTENANCE LETTER (OUTPATIENT)
Age: 30
End: 2025-07-12

## (undated) RX ORDER — BUPIVACAINE HYDROCHLORIDE 2.5 MG/ML
INJECTION, SOLUTION EPIDURAL; INFILTRATION; INTRACAUDAL
Status: DISPENSED
Start: 2023-05-03

## (undated) RX ORDER — EPINEPHRINE 1 MG/ML
INJECTION, SOLUTION, CONCENTRATE INTRAVENOUS
Status: DISPENSED
Start: 2023-05-03

## (undated) RX ORDER — LIDOCAINE HYDROCHLORIDE 10 MG/ML
INJECTION, SOLUTION EPIDURAL; INFILTRATION; INTRACAUDAL; PERINEURAL
Status: DISPENSED
Start: 2023-05-03